# Patient Record
Sex: FEMALE | URBAN - METROPOLITAN AREA
[De-identification: names, ages, dates, MRNs, and addresses within clinical notes are randomized per-mention and may not be internally consistent; named-entity substitution may affect disease eponyms.]

---

## 2024-05-07 ENCOUNTER — APPOINTMENT (RX ONLY)
Dept: URBAN - METROPOLITAN AREA CLINIC 110 | Facility: CLINIC | Age: 39
Setting detail: DERMATOLOGY
End: 2024-05-07

## 2024-05-07 DIAGNOSIS — L30.9 DERMATITIS, UNSPECIFIED: ICD-10-CM

## 2024-05-07 DIAGNOSIS — D18.0 HEMANGIOMA: ICD-10-CM

## 2024-05-07 PROBLEM — D18.01 HEMANGIOMA OF SKIN AND SUBCUTANEOUS TISSUE: Status: ACTIVE | Noted: 2024-05-07

## 2024-05-07 PROCEDURE — ? MEDICAL CONSULTATION: PULSED-DYE LASER

## 2024-05-07 PROCEDURE — 99202 OFFICE O/P NEW SF 15 MIN: CPT

## 2024-05-07 PROCEDURE — ? ORDER TESTS

## 2024-05-07 PROCEDURE — ? PRESCRIPTION

## 2024-05-07 PROCEDURE — ? ADDITIONAL NOTES

## 2024-05-07 PROCEDURE — ? PRESCRIPTION MEDICATION MANAGEMENT

## 2024-05-07 PROCEDURE — ? COUNSELING

## 2024-05-07 RX ORDER — MOMETASONE FUROATE 1 MG/G
1 CREAM TOPICAL BID, PRN
Qty: 45 | Refills: 1 | Status: ERX | COMMUNITY
Start: 2024-05-07

## 2024-05-07 RX ADMIN — MOMETASONE FUROATE 1: 1 CREAM TOPICAL at 00:00

## 2024-05-07 ASSESSMENT — LOCATION SIMPLE DESCRIPTION DERM
LOCATION SIMPLE: NOSE
LOCATION SIMPLE: RIGHT CHEEK
LOCATION SIMPLE: LEFT CHEEK

## 2024-05-07 ASSESSMENT — LOCATION ZONE DERM
LOCATION ZONE: FACE
LOCATION ZONE: NOSE

## 2024-05-07 ASSESSMENT — LOCATION DETAILED DESCRIPTION DERM
LOCATION DETAILED: RIGHT CENTRAL MALAR CHEEK
LOCATION DETAILED: NASAL SUPRATIP
LOCATION DETAILED: LEFT MEDIAL MALAR CHEEK

## 2024-05-07 ASSESSMENT — ITCH NUMERIC RATING SCALE: HOW SEVERE IS YOUR ITCHING?: 0

## 2024-05-07 ASSESSMENT — SEVERITY ASSESSMENT: SEVERITY: MILD

## 2024-05-07 ASSESSMENT — BSA RASH: BSA RASH: 1

## 2024-05-07 NOTE — PROCEDURE: ORDER TESTS
Expected Date Of Service: 05/07/2024
Bill For Surgical Tray: no
Billing Type: Third-Party Bill
Performing Laboratory: [id number]

## 2024-05-07 NOTE — HPI: EVALUATION OF SKIN LESION(S)
What Type Of Note Output Would You Prefer (Optional)?: Standard Output
How Severe Are Your Spot(S)?: mild
Have Your Spot(S) Been Treated In The Past?: has not been treated
Hpi Title: Evaluation of Skin Lesions
Additional History: Patient states she recently began consuming a liquid collagen drink in the past month.

## 2024-05-07 NOTE — PROCEDURE: PRESCRIPTION MEDICATION MANAGEMENT
Render In Strict Bullet Format?: No
Initiate Treatment: Mometasone 0.1% cream BID as needed to affected areas on face
Detail Level: Simple

## 2024-05-07 NOTE — PROCEDURE: ADDITIONAL NOTES
CBC WNL  Elevated CRP, nl ESR  D/W Lolis Flood at f/u
Render Risk Assessment In Note?: no
Additional Notes: I could not find a plausible trigger factor, although, patient states she began consuming a collagen drink a month ago
Detail Level: Simple